# Patient Record
Sex: FEMALE | Race: WHITE | URBAN - METROPOLITAN AREA
[De-identification: names, ages, dates, MRNs, and addresses within clinical notes are randomized per-mention and may not be internally consistent; named-entity substitution may affect disease eponyms.]

---

## 2022-01-13 ENCOUNTER — HOSPITAL ENCOUNTER (OUTPATIENT)
Age: 37
Setting detail: SPECIMEN
Discharge: HOME | End: 2022-01-13

## 2022-01-13 ENCOUNTER — OFFICE VISIT (OUTPATIENT)
Dept: OBGYN CLINIC | Age: 37
End: 2022-01-13
Payer: COMMERCIAL

## 2022-01-13 VITALS
WEIGHT: 131.2 LBS | DIASTOLIC BLOOD PRESSURE: 79 MMHG | HEIGHT: 63 IN | BODY MASS INDEX: 23.25 KG/M2 | SYSTOLIC BLOOD PRESSURE: 118 MMHG

## 2022-01-13 DIAGNOSIS — Z01.419 WOMEN'S ANNUAL ROUTINE GYNECOLOGICAL EXAMINATION: Primary | ICD-10-CM

## 2022-01-13 PROCEDURE — G8484 FLU IMMUNIZE NO ADMIN: HCPCS | Performed by: NURSE PRACTITIONER

## 2022-01-13 PROCEDURE — 99385 PREV VISIT NEW AGE 18-39: CPT | Performed by: NURSE PRACTITIONER

## 2022-01-13 ASSESSMENT — ENCOUNTER SYMPTOMS
ABDOMINAL PAIN: 0
DIARRHEA: 0
SHORTNESS OF BREATH: 0
CONSTIPATION: 0

## 2022-01-13 NOTE — PROGRESS NOTES
YEARLY PHYSICAL    Date of service: 2022    Zora Vidal  Is a 39 y.o. female    PT's PCP is: Lei Klein DO     : 1985                                         Chaperone for Intimate Exam   Chaperone was offered as part of the rooming process. Patient declined and agrees to continue with exam without a chaperone.  Chaperone: n/a      Subjective:       Patient's last menstrual period was 2021.      Are your menses regular: yes    OB History    Para Term  AB Living   1 1 1     1   SAB IAB Ectopic Molar Multiple Live Births             1      # Outcome Date GA Lbr Davey/2nd Weight Sex Delivery Anes PTL Lv   1 Term  40w0d  14 lb 1 oz (6.379 kg) F CS-LTranv   SUSU        Social History     Tobacco Use   Smoking Status Former Smoker    Packs/day: 0.50    Years: 18.00    Pack years: 9.00    Quit date: 2017    Years since quittin.0   Smokeless Tobacco Never Used        Social History     Substance and Sexual Activity   Alcohol Use Yes    Comment: occ       Family History   Problem Relation Age of Onset    Colon Cancer Mother 50       Any family history of breast or ovarian cancer: No    Any family history of blood clots: No      Allergies: Pcn [penicillins], Sulfa antibiotics, and Morphine      Current Outpatient Medications:     BIOTIN PO, Take by mouth, Disp: , Rfl:     Multiple Vitamin (MULTIVITAMIN ADULT PO), Take by mouth, Disp: , Rfl:     VITAMIN D PO, Take by mouth, Disp: , Rfl:     Ascorbic Acid (VITAMIN C PO), Take by mouth, Disp: , Rfl:     Social History     Substance and Sexual Activity   Sexual Activity Yes    Partners: Male    Birth control/protection: Male Sterilization       Any bleeding or pain with intercourse: No    Last pap: few years ago    Last HPV: ?    Last Mammogram: n/a    Last colorectal screen- 2016    Do you do self breast exams: encouraged monthly SBE     Past Medical History:   Diagnosis Date    History of colon polyps        Past Surgical History:   Procedure Laterality Date     SECTION  04    COLONOSCOPY  2016    Dr. Betty Nagy       Family History   Problem Relation Age of Onset    Colon Cancer Mother 50       Chief Complaint   Patient presents with    Gynecologic Exam     Last pap a few years ago. Had irregular cycle after COVID booster. Feeling well, voices no concerns. PE:  Vital Signs  Blood pressure 118/79, height 5' 3\" (1.6 m), weight 131 lb 3.2 oz (59.5 kg), last menstrual period 2021. Estimated body mass index is 23.24 kg/m² as calculated from the following:    Height as of this encounter: 5' 3\" (1.6 m). Weight as of this encounter: 131 lb 3.2 oz (59.5 kg). HPI: Patient presents today for annual exam. Feeling well, voices no concerns. Denies breast/pelvic pain. Due for pap. Wellness reviewed. Reports monthly menses aside from irregular bleeding following Covid booster. LMP was normal.        Review of Systems   Constitutional: Negative for chills, fatigue and fever. Respiratory: Negative for shortness of breath. Cardiovascular: Negative for chest pain. Gastrointestinal: Negative for abdominal pain, constipation and diarrhea. Genitourinary: Negative for dysuria, enuresis, frequency, menstrual problem, pelvic pain, urgency and vaginal bleeding. Neurological: Negative for dizziness, light-headedness and headaches. Physical Exam  Constitutional:       General: She is not in acute distress. Appearance: Normal appearance. She is not ill-appearing. Genitourinary:      Vulva normal.      No vaginal discharge. Right Adnexa: not tender. Left Adnexa: not tender. Uterus is not tender. Breasts:      Right: No mass, nipple discharge, skin change or tenderness. Left: No mass, nipple discharge, skin change or tenderness. HENT:      Head: Normocephalic.    Cardiovascular:      Rate and Rhythm: Normal rate and regular rhythm. Pulmonary:      Effort: Pulmonary effort is normal.      Breath sounds: Normal breath sounds. Abdominal:      Palpations: Abdomen is soft. Tenderness: There is no abdominal tenderness. There is no guarding or rebound. Musculoskeletal:         General: Normal range of motion. Neurological:      General: No focal deficit present. Mental Status: She is alert. Psychiatric:         Mood and Affect: Mood normal.         Behavior: Behavior normal.                         Assessment and Plan          Diagnosis Orders   1. Women's annual routine gynecological examination  PAP SMEAR       Repeat Annual every 1 year  Cervical Cytology Evaluation begins at 24years old. If Negative Cytology, Follow-up screening per current guidelines. Mammograms every 1year. If 35 yo and last mammogram was negative. Routine healthmaintenance per patients PCP. I have discontinued Rayna Ortega's Levonorgestrel-Ethinyl Estrad (SRONYX PO). I am also having her maintain her BIOTIN PO, Multiple Vitamin (MULTIVITAMIN ADULT PO), VITAMIN D PO, and Ascorbic Acid (VITAMIN C PO). Return in about 1 year (around 1/13/2023) for yearly. She was also counseled on her preventative health maintenance recommendations and follow-up. There are no Patient Instructions on file for this visit.     MILTON Farfan NP,1/13/2022 11:01 AM

## 2022-01-20 LAB
HPV SAMPLE: NORMAL
HPV, GENOTYPE 16: NOT DETECTED
HPV, GENOTYPE 18: NOT DETECTED
HPV, HIGH RISK OTHER: NOT DETECTED
HPV, INTERPRETATION: NORMAL
SPECIMEN DESCRIPTION: NORMAL

## 2022-01-21 LAB — CYTOLOGY REPORT: NORMAL

## 2023-02-23 ENCOUNTER — OFFICE VISIT (OUTPATIENT)
Dept: OBGYN CLINIC | Age: 38
End: 2023-02-23
Payer: COMMERCIAL

## 2023-02-23 VITALS
WEIGHT: 132.4 LBS | DIASTOLIC BLOOD PRESSURE: 80 MMHG | SYSTOLIC BLOOD PRESSURE: 137 MMHG | HEIGHT: 63 IN | BODY MASS INDEX: 23.46 KG/M2

## 2023-02-23 DIAGNOSIS — N92.1 MENORRHAGIA WITH IRREGULAR CYCLE: ICD-10-CM

## 2023-02-23 DIAGNOSIS — Z01.419 WOMEN'S ANNUAL ROUTINE GYNECOLOGICAL EXAMINATION: Primary | ICD-10-CM

## 2023-02-23 PROCEDURE — 99395 PREV VISIT EST AGE 18-39: CPT | Performed by: NURSE PRACTITIONER

## 2023-02-23 PROCEDURE — G8484 FLU IMMUNIZE NO ADMIN: HCPCS | Performed by: NURSE PRACTITIONER

## 2023-02-23 ASSESSMENT — ENCOUNTER SYMPTOMS
DIARRHEA: 0
SHORTNESS OF BREATH: 0
ABDOMINAL PAIN: 0
CONSTIPATION: 0

## 2023-02-23 NOTE — PROGRESS NOTES
YEARLY PHYSICAL    Date of service: 2023    Sol Bautista  Is a 44 y.o. female    PT's PCP is: Ericka Garcia DO     : 1985                                         Chaperone for Intimate Exam  Chaperone was offered as part of the rooming process. Patient declined and agrees to continue with exam without a chaperone. Chaperone: n/a      Subjective:       Patient's last menstrual period was 02/15/2023.      Are your menses regular: no    OB History    Para Term  AB Living   1 1 1     1   SAB IAB Ectopic Molar Multiple Live Births             1      # Outcome Date GA Lbr Davey/2nd Weight Sex Delivery Anes PTL Lv   1 Term  40w0d  14 lb 1 oz (6.379 kg) F CS-LTranv   SUSU        Social History     Tobacco Use   Smoking Status Former    Packs/day: 0.50    Years: 18.00    Pack years: 9.00    Types: Cigarettes, E-Cigarettes    Quit date: 2017    Years since quittin.1   Smokeless Tobacco Never        Social History     Substance and Sexual Activity   Alcohol Use Yes    Comment: Does not drink often       Family History   Problem Relation Age of Onset    Colon Cancer Mother 50       Any family history of breast or ovarian cancer: No    Any family history of blood clots: No      Allergies: Pcn [penicillins], Sulfa antibiotics, and Morphine      Current Outpatient Medications:     Loratadine (CLARITIN PO), , Disp: , Rfl:     BIOTIN PO, Take by mouth, Disp: , Rfl:     Multiple Vitamin (MULTIVITAMIN ADULT PO), Take by mouth, Disp: , Rfl:     Social History     Substance and Sexual Activity   Sexual Activity Yes    Partners: Male    Birth control/protection: Male Sterilization       Any bleeding or pain with intercourse: No    Last Yearly:  22    Last pap: 22-neg    Last HPV: 22-neg    Last Mammogram: n/a    Last Dexascan n/a    Last colorectal screen- n/a    Do you do self breast exams: Yes    Past Medical History:   Diagnosis Date    History of colon polyps        Past Surgical History:   Procedure Laterality Date     SECTION  2004    COLONOSCOPY  2016    Dr. Betsy Majaon       Family History   Problem Relation Age of Onset    Colon Cancer Mother 50       Chief Complaint   Patient presents with    Annual Exam     Last pap 22. Menstrual Problem     C/O menses heavy and lasting 7+ days. Discuss OCP for tx. Sometimes menses every 2 wks. PE:  Vital Signs  Blood pressure 137/80, height 5' 3\" (1.6 m), weight 132 lb 6.4 oz (60.1 kg), last menstrual period 02/15/2023. Estimated body mass index is 23.45 kg/m² as calculated from the following:    Height as of this encounter: 5' 3\" (1.6 m). Weight as of this encounter: 132 lb 6.4 oz (60.1 kg). NURSE: hannah    HPI: Patient presents today for annual exam. Denies breast/pelvic pain. Denies bowel/bladder concerns. Negative pap 2022. Wellness reviewed. Reports irregular menses cycle length varies from 17-30 days over the past year. 17 day cycle was isolated and not a repetitive issue. Bleeding is lasting 7-9 days and heavy with cramping on days 2-3. Denies any BTB. Review of Systems   Constitutional:  Negative for chills, fatigue and fever. Respiratory:  Negative for shortness of breath. Cardiovascular:  Negative for chest pain. Gastrointestinal:  Negative for abdominal pain, constipation and diarrhea. Genitourinary:  Positive for menstrual problem. Negative for dyspareunia, dysuria, enuresis, frequency, pelvic pain, urgency, vaginal bleeding, vaginal discharge and vaginal pain. Neurological:  Negative for dizziness, light-headedness and headaches. Physical Exam  Constitutional:       General: She is not in acute distress. Appearance: Normal appearance. She is not ill-appearing. Genitourinary:      Vulva, bladder and urethral meatus normal.      No lesions in the vagina. Right Labia: No rash or lesions.      Left Labia: No lesions or rash. No vaginal discharge. No vaginal prolapse present. No vaginal atrophy present. Right Adnexa: not tender and no mass present. Left Adnexa: not tender and no mass present. No cervical motion tenderness, discharge or friability. No parametrium nodularity present. Uterus is not enlarged or tender. No uterine mass detected. No urethral prolapse, tenderness or mass present. Breasts:     Right: No mass, nipple discharge, skin change or tenderness. Left: No mass, nipple discharge, skin change or tenderness. HENT:      Head: Normocephalic and atraumatic. Eyes:      Extraocular Movements: Extraocular movements intact. Pupils: Pupils are equal, round, and reactive to light. Cardiovascular:      Rate and Rhythm: Normal rate. Pulmonary:      Effort: Pulmonary effort is normal.   Abdominal:      Palpations: Abdomen is soft. Tenderness: There is no abdominal tenderness. There is no guarding or rebound. Musculoskeletal:         General: Normal range of motion. Neurological:      General: No focal deficit present. Mental Status: She is alert and oriented to person, place, and time. Skin:     General: Skin is warm and dry. Psychiatric:         Mood and Affect: Mood normal.         Behavior: Behavior normal.                           Assessment & Plan  1. Women's annual routine gynecological examination  Repeat Annual every 1 year  Cervical Cytology Evaluation begins at 24years old. If Negative Cytology, Follow-up screening per current guidelines. Mammograms every 1year. If 37 yo and last mammogram was negative. Routine healthmaintenance per patients PCP. 2. Menorrhagia with irregular cycle  Discussed ultrasound day 7-9 patient is agreeable. She desires OCP to help regulate menses. Bothered by variation in cycle lengths. Return for call day 1 of menses, usn day 7-9.      Electronically Signed by MILTON March NP

## 2023-03-20 ENCOUNTER — OFFICE VISIT (OUTPATIENT)
Dept: OBGYN CLINIC | Age: 38
End: 2023-03-20
Payer: COMMERCIAL

## 2023-03-20 ENCOUNTER — HOSPITAL ENCOUNTER (OUTPATIENT)
Age: 38
Setting detail: SPECIMEN
Discharge: HOME | End: 2023-03-20

## 2023-03-20 VITALS — WEIGHT: 132 LBS | BODY MASS INDEX: 23.38 KG/M2 | SYSTOLIC BLOOD PRESSURE: 110 MMHG | DIASTOLIC BLOOD PRESSURE: 72 MMHG

## 2023-03-20 DIAGNOSIS — N92.1 MENORRHAGIA WITH IRREGULAR CYCLE: Primary | ICD-10-CM

## 2023-03-20 DIAGNOSIS — N83.202 LEFT OVARIAN CYST: ICD-10-CM

## 2023-03-20 LAB — CANCER AG125 SERPL-ACNC: 19 U/ML

## 2023-03-20 PROCEDURE — G8427 DOCREV CUR MEDS BY ELIG CLIN: HCPCS | Performed by: NURSE PRACTITIONER

## 2023-03-20 PROCEDURE — G8420 CALC BMI NORM PARAMETERS: HCPCS | Performed by: NURSE PRACTITIONER

## 2023-03-20 PROCEDURE — 1036F TOBACCO NON-USER: CPT | Performed by: NURSE PRACTITIONER

## 2023-03-20 PROCEDURE — G8484 FLU IMMUNIZE NO ADMIN: HCPCS | Performed by: NURSE PRACTITIONER

## 2023-03-20 PROCEDURE — 99213 OFFICE O/P EST LOW 20 MIN: CPT | Performed by: NURSE PRACTITIONER

## 2023-03-20 RX ORDER — ACETAMINOPHEN AND CODEINE PHOSPHATE 120; 12 MG/5ML; MG/5ML
1 SOLUTION ORAL DAILY
Qty: 28 TABLET | Refills: 3 | Status: SHIPPED | OUTPATIENT
Start: 2023-03-20

## 2023-03-20 SDOH — ECONOMIC STABILITY: FOOD INSECURITY: WITHIN THE PAST 12 MONTHS, YOU WORRIED THAT YOUR FOOD WOULD RUN OUT BEFORE YOU GOT MONEY TO BUY MORE.: NEVER TRUE

## 2023-03-20 SDOH — ECONOMIC STABILITY: TRANSPORTATION INSECURITY
IN THE PAST 12 MONTHS, HAS LACK OF TRANSPORTATION KEPT YOU FROM MEETINGS, WORK, OR FROM GETTING THINGS NEEDED FOR DAILY LIVING?: NO

## 2023-03-20 SDOH — ECONOMIC STABILITY: INCOME INSECURITY: HOW HARD IS IT FOR YOU TO PAY FOR THE VERY BASICS LIKE FOOD, HOUSING, MEDICAL CARE, AND HEATING?: SOMEWHAT HARD

## 2023-03-20 SDOH — ECONOMIC STABILITY: FOOD INSECURITY: WITHIN THE PAST 12 MONTHS, THE FOOD YOU BOUGHT JUST DIDN'T LAST AND YOU DIDN'T HAVE MONEY TO GET MORE.: NEVER TRUE

## 2023-03-20 SDOH — ECONOMIC STABILITY: HOUSING INSECURITY
IN THE LAST 12 MONTHS, WAS THERE A TIME WHEN YOU DID NOT HAVE A STEADY PLACE TO SLEEP OR SLEPT IN A SHELTER (INCLUDING NOW)?: NO

## 2023-03-20 NOTE — PROGRESS NOTES
PROBLEM VISIT     Date of service: 3/20/2023    Gwen Ortiz  Is a 44 y.o. female    PT's PCP is: Arnaldo Richard II, DO     : 1985                                             Subjective:       Patient's last menstrual period was 2023. OB History    Para Term  AB Living   1 1 1     1   SAB IAB Ectopic Molar Multiple Live Births             1      # Outcome Date GA Lbr Davey/2nd Weight Sex Delivery Anes PTL Lv   1 Term  40w0d  14 lb 1 oz (6.379 kg) F CS-LTranv   SUSU        Social History     Tobacco Use   Smoking Status Former    Packs/day: 0.50    Years: 18.00    Pack years: 9.00    Types: Cigarettes, E-Cigarettes    Quit date: 2017    Years since quittin.2   Smokeless Tobacco Never        Social History     Substance and Sexual Activity   Alcohol Use Yes    Comment: Does not drink often       Allergies: Pcn [penicillins], Sulfa antibiotics, and Morphine      Current Outpatient Medications:     norethindrone (ORTHO MICRONOR) 0.35 MG tablet, Take 1 tablet by mouth daily, Disp: 28 tablet, Rfl: 3    Loratadine (CLARITIN PO), , Disp: , Rfl:     BIOTIN PO, Take by mouth, Disp: , Rfl:     Multiple Vitamin (MULTIVITAMIN ADULT PO), Take by mouth, Disp: , Rfl:     Social History     Substance and Sexual Activity   Sexual Activity Yes    Partners: Male    Birth control/protection: Male Sterilization     Chief Complaint   Patient presents with    Follow-up     Follow up usn for irregular heavy menses. PE:  Vital Signs  Blood pressure 110/72, weight 132 lb (59.9 kg), last menstrual period 2023. HPI: Patinet here following ultrasound for irregular, heavy menses. Reports irregular menses; cycle length varies from 17-30 days over the past year. 17 day cycle was isolated and not a repetitive issue. Bleeding is lasting 7-9 days and heavy with cramping on days 2-3. Denies any BTB.         PT denies fever, chills, nausea and vomiting       Review of Systems

## 2023-03-26 ASSESSMENT — ENCOUNTER SYMPTOMS: RESPIRATORY NEGATIVE: 1

## 2023-05-17 ENCOUNTER — TELEPHONE (OUTPATIENT)
Dept: OBGYN CLINIC | Age: 38
End: 2023-05-17

## 2023-06-19 RX ORDER — ACETAMINOPHEN AND CODEINE PHOSPHATE 120; 12 MG/5ML; MG/5ML
SOLUTION ORAL
Qty: 28 TABLET | Refills: 0 | OUTPATIENT
Start: 2023-06-19

## 2023-06-27 ENCOUNTER — OFFICE VISIT (OUTPATIENT)
Dept: OBGYN CLINIC | Age: 38
End: 2023-06-27

## 2023-06-27 VITALS
BODY MASS INDEX: 23.74 KG/M2 | HEIGHT: 63 IN | WEIGHT: 134 LBS | SYSTOLIC BLOOD PRESSURE: 102 MMHG | DIASTOLIC BLOOD PRESSURE: 70 MMHG

## 2023-06-27 DIAGNOSIS — N92.1 MENORRHAGIA WITH IRREGULAR CYCLE: Primary | ICD-10-CM

## 2023-06-27 DIAGNOSIS — N83.202 LEFT OVARIAN CYST: ICD-10-CM

## 2023-06-27 PROCEDURE — 99213 OFFICE O/P EST LOW 20 MIN: CPT | Performed by: NURSE PRACTITIONER

## 2023-06-30 RX ORDER — NORGESTREL-ETHINYL ESTRADIOL 0.3-0.03MG
1 TABLET ORAL DAILY
Qty: 1 PACKET | Refills: 3 | Status: SHIPPED | OUTPATIENT
Start: 2023-06-30

## 2023-06-30 ASSESSMENT — ENCOUNTER SYMPTOMS: RESPIRATORY NEGATIVE: 1

## 2023-10-13 DIAGNOSIS — N92.1 MENORRHAGIA WITH IRREGULAR CYCLE: ICD-10-CM

## 2023-10-13 RX ORDER — NORGESTREL AND ETHINYL ESTRADIOL 0.3-0.03MG
1 KIT ORAL DAILY
Qty: 28 TABLET | Refills: 0 | OUTPATIENT
Start: 2023-10-13

## 2023-10-16 NOTE — TELEPHONE ENCOUNTER
Patient needs refill of Cryselle to [facility].   She is scheduled for annual in Feb.

## 2024-01-29 ENCOUNTER — TELEPHONE (OUTPATIENT)
Dept: OBGYN CLINIC | Age: 39
End: 2024-01-29

## 2024-01-29 NOTE — TELEPHONE ENCOUNTER
I called patient l/m for her to return our call.  (I went ahead and put her in [facility] schedule on 2-26 at 1:00 which is the same day as her annual)

## 2024-01-29 NOTE — TELEPHONE ENCOUNTER
Please let patient know I received records from recent ER visit- ovarian mass still present, stable in size. I would like usn prior to annual and can again discuss options for management. Unless she would like to be seen prior.

## 2024-02-08 RX ORDER — NORGESTREL AND ETHINYL ESTRADIOL 0.3-0.03MG
1 KIT ORAL DAILY
Qty: 28 TABLET | Refills: 0 | Status: SHIPPED | OUTPATIENT
Start: 2024-02-08

## 2024-02-26 ENCOUNTER — OFFICE VISIT (OUTPATIENT)
Dept: OBGYN CLINIC | Age: 39
End: 2024-02-26

## 2024-02-26 VITALS
BODY MASS INDEX: 24.66 KG/M2 | WEIGHT: 139.2 LBS | SYSTOLIC BLOOD PRESSURE: 128 MMHG | HEIGHT: 63 IN | DIASTOLIC BLOOD PRESSURE: 82 MMHG

## 2024-02-26 DIAGNOSIS — N83.201 OVARIAN CYST, BILATERAL: ICD-10-CM

## 2024-02-26 DIAGNOSIS — N92.1 MENORRHAGIA WITH IRREGULAR CYCLE: ICD-10-CM

## 2024-02-26 DIAGNOSIS — Z01.419 WOMEN'S ANNUAL ROUTINE GYNECOLOGICAL EXAMINATION: Primary | ICD-10-CM

## 2024-02-26 DIAGNOSIS — N83.202 OVARIAN CYST, BILATERAL: ICD-10-CM

## 2024-02-26 PROCEDURE — 99395 PREV VISIT EST AGE 18-39: CPT | Performed by: NURSE PRACTITIONER

## 2024-02-26 ASSESSMENT — ENCOUNTER SYMPTOMS
SHORTNESS OF BREATH: 0
ABDOMINAL PAIN: 0
CONSTIPATION: 0
DIARRHEA: 0

## 2024-02-26 NOTE — PROGRESS NOTES
YEARLY PHYSICAL    Date of service: 2024    Rayna Ortega  Is a 38 y.o. female    PT's PCP is: Zach English II, DO     : 1985                                         Chaperone for Intimate Exam  Chaperone was offered as part of the rooming process. Patient declined and agrees to continue with exam without a chaperone.  Chaperone: n/a      Subjective:       Patient's last menstrual period was 2024.     Are your menses regular: yes    OB History    Para Term  AB Living   1 1 1     1   SAB IAB Ectopic Molar Multiple Live Births             1      # Outcome Date GA Lbr Davey/2nd Weight Sex Delivery Anes PTL Lv   1 Term  40w0d  6.379 kg (14 lb 1 oz) F CS-LTranv   SUSU        Social History     Tobacco Use   Smoking Status Former    Current packs/day: 0.00    Average packs/day: 0.5 packs/day for 18.0 years (9.0 ttl pk-yrs)    Types: Cigarettes, E-Cigarettes    Start date: 1999    Quit date: 2017    Years since quittin.1   Smokeless Tobacco Never        Social History     Substance and Sexual Activity   Alcohol Use Yes    Comment: Does not drink often       Family History   Problem Relation Age of Onset    Colon Cancer Mother 48       Any family history of breast or ovarian cancer: No    Any family history of blood clots: No      Allergies: Pcn [penicillins], Sulfa antibiotics, and Morphine      Current Outpatient Medications:     norgestrel-ethinyl estradiol (ELINEST) 0.3-30 MG-MCG per tablet, Take 1 tablet by mouth daily, Disp: 28 tablet, Rfl: 12    Cetirizine HCl (ZYRTEC ALLERGY PO), , Disp: , Rfl:     Multiple Vitamin (MULTIVITAMIN ADULT PO), Take by mouth, Disp: , Rfl:     Social History     Substance and Sexual Activity   Sexual Activity Yes    Partners: Male    Birth control/protection: Male Sterilization       Any bleeding or pain with intercourse: No    Last Yearly:  23    Last pap:

## 2024-02-29 RX ORDER — NORGESTREL AND ETHINYL ESTRADIOL 0.3-0.03MG
1 KIT ORAL DAILY
Qty: 28 TABLET | Refills: 12 | Status: SHIPPED | OUTPATIENT
Start: 2024-02-29

## 2024-11-19 ENCOUNTER — TELEPHONE (OUTPATIENT)
Dept: OBGYN CLINIC | Age: 39
End: 2024-11-19

## 2024-11-19 NOTE — TELEPHONE ENCOUNTER
Patient called.  She reports  that the  just rescheduled her annual with Jose from March until June (d/t Jose being on maternity leave). Patient concerned about running out of ocp  before her appointment.  Advised patient that we can give her enough to last until that June visit. She will call back when she picks up last rx from the pharamcy in Jan./feb and we can give refills at that time to last until next visit.

## 2025-01-16 DIAGNOSIS — N92.1 MENORRHAGIA WITH IRREGULAR CYCLE: ICD-10-CM

## 2025-01-16 RX ORDER — NORGESTREL AND ETHINYL ESTRADIOL 0.3-0.03MG
1 KIT ORAL DAILY
Qty: 28 TABLET | Refills: 6 | Status: SHIPPED | OUTPATIENT
Start: 2025-01-16

## 2025-06-11 NOTE — PROGRESS NOTES
YEARLY PHYSICAL    Date of service: 2025    Rayna Ortega  Is a 40 y.o. female    PT's PCP is: Zach English II, DO     : 1985                                         Chaperone for Intimate Exam  Chaperone was offered as part of the rooming process. Patient declined and agrees to continue with exam without a chaperone.  Chaperone: n/a      Subjective:       Patient's last menstrual period was 2025.     Are your menses regular: yes    OB History    Para Term  AB Living   1 1 1   1   SAB IAB Ectopic Molar Multiple Live Births        1      # Outcome Date GA Lbr Davey/2nd Weight Sex Type Anes PTL Lv   1 Term  40w0d  6.379 kg (14 lb 1 oz) F CS-LTranv   SUSU        Social History     Tobacco Use   Smoking Status Former    Current packs/day: 0.00    Average packs/day: 0.5 packs/day for 18.0 years (9.0 ttl pk-yrs)    Types: Cigarettes, E-Cigarettes    Start date: 1999    Quit date: 2017    Years since quittin.4   Smokeless Tobacco Never        Social History     Substance and Sexual Activity   Alcohol Use Yes    Comment: Does not drink often       Family History   Problem Relation Age of Onset    Colon Cancer Mother 48       Any family history of breast or ovarian cancer: No    Any family history of blood clots: No      Allergies: Pcn [penicillins], Sulfa antibiotics, and Morphine      Current Outpatient Medications:     norgestrel-ethinyl estradiol (ELINEST) 0.3-30 MG-MCG per tablet, Take 1 tablet by mouth daily, Disp: 28 tablet, Rfl: 12    Cetirizine HCl (ZYRTEC ALLERGY PO), , Disp: , Rfl:     Multiple Vitamin (MULTIVITAMIN ADULT PO), Take by mouth, Disp: , Rfl:     Social History     Substance and Sexual Activity   Sexual Activity Yes    Partners: Male    Birth control/protection: Male Sterilization       Any bleeding or pain with intercourse: No    Last Yearly:  24    Last pap:

## 2025-06-12 ENCOUNTER — OFFICE VISIT (OUTPATIENT)
Dept: OBGYN CLINIC | Age: 40
End: 2025-06-12

## 2025-06-12 ENCOUNTER — HOSPITAL ENCOUNTER (OUTPATIENT)
Age: 40
Setting detail: SPECIMEN
Discharge: HOME | End: 2025-06-12

## 2025-06-12 VITALS — SYSTOLIC BLOOD PRESSURE: 126 MMHG | DIASTOLIC BLOOD PRESSURE: 82 MMHG | BODY MASS INDEX: 24.66 KG/M2 | HEIGHT: 63 IN

## 2025-06-12 DIAGNOSIS — Z01.419 WOMEN'S ANNUAL ROUTINE GYNECOLOGICAL EXAMINATION: Primary | ICD-10-CM

## 2025-06-12 DIAGNOSIS — N92.1 MENORRHAGIA WITH IRREGULAR CYCLE: ICD-10-CM

## 2025-06-12 PROCEDURE — 99396 PREV VISIT EST AGE 40-64: CPT | Performed by: NURSE PRACTITIONER

## 2025-06-13 LAB
HPV I/H RISK 4 DNA CVX QL NAA+PROBE: NORMAL
HPV SAMPLE: NORMAL
HPV, INTERPRETATION: NORMAL
HPV16 DNA CVX QL NAA+PROBE: NORMAL
HPV18 DNA CVX QL NAA+PROBE: NORMAL
SPECIMEN DESCRIPTION: NORMAL

## 2025-06-16 LAB
HPV I/H RISK 4 DNA CVX QL NAA+PROBE: NOT DETECTED
HPV SAMPLE: NORMAL
HPV, INTERPRETATION: NORMAL
HPV16 DNA CVX QL NAA+PROBE: NOT DETECTED
HPV18 DNA CVX QL NAA+PROBE: NOT DETECTED
SPECIMEN DESCRIPTION: NORMAL

## 2025-06-19 ENCOUNTER — RESULTS FOLLOW-UP (OUTPATIENT)
Dept: OBGYN CLINIC | Age: 40
End: 2025-06-19

## 2025-06-19 LAB — CYTOLOGY REPORT: NORMAL

## 2025-06-24 RX ORDER — NORGESTREL AND ETHINYL ESTRADIOL 0.3-0.03MG
1 KIT ORAL DAILY
Qty: 28 TABLET | Refills: 12 | Status: SHIPPED | OUTPATIENT
Start: 2025-06-24